# Patient Record
Sex: MALE | Race: WHITE | Employment: OTHER | URBAN - NONMETROPOLITAN AREA
[De-identification: names, ages, dates, MRNs, and addresses within clinical notes are randomized per-mention and may not be internally consistent; named-entity substitution may affect disease eponyms.]

---

## 2017-11-30 ENCOUNTER — HOSPITAL ENCOUNTER (OUTPATIENT)
Dept: INTERVENTIONAL RADIOLOGY/VASCULAR | Age: 77
Discharge: HOME | End: 2017-11-30
Payer: MEDICARE

## 2017-11-30 DIAGNOSIS — I73.9 INTERMITTENT CLAUDICATION (HCC): ICD-10-CM

## 2017-11-30 PROCEDURE — 93923 UPR/LXTR ART STDY 3+ LVLS: CPT

## 2019-09-10 ENCOUNTER — HOSPITAL ENCOUNTER (OUTPATIENT)
Dept: WOUND CARE | Age: 79
Discharge: HOME | End: 2019-09-10
Payer: MEDICARE

## 2019-09-10 VITALS
DIASTOLIC BLOOD PRESSURE: 68 MMHG | RESPIRATION RATE: 18 BRPM | SYSTOLIC BLOOD PRESSURE: 152 MMHG | OXYGEN SATURATION: 95 % | HEART RATE: 64 BPM | TEMPERATURE: 97.9 F

## 2019-09-10 DIAGNOSIS — I87.2 VENOUS (PERIPHERAL) INSUFFICIENCY: ICD-10-CM

## 2019-09-10 DIAGNOSIS — E11.621 TYPE 2 DIABETES MELLITUS WITH FOOT ULCER, UNSPECIFIED WHETHER LONG TERM INSULIN USE (HCC): ICD-10-CM

## 2019-09-10 DIAGNOSIS — L97.509 TYPE 2 DIABETES MELLITUS WITH FOOT ULCER, UNSPECIFIED WHETHER LONG TERM INSULIN USE (HCC): ICD-10-CM

## 2019-09-10 DIAGNOSIS — S91.301D OPEN WOUND OF RIGHT HEEL, SUBSEQUENT ENCOUNTER: Primary | ICD-10-CM

## 2019-09-10 PROCEDURE — 2709999900 HC NON-CHARGEABLE SUPPLY

## 2019-09-10 PROCEDURE — 97597 DBRDMT OPN WND 1ST 20 CM/<: CPT

## 2019-09-10 ASSESSMENT — PAIN SCALES - GENERAL: PAINLEVEL_OUTOF10: 0

## 2019-09-10 NOTE — PROGRESS NOTES
[facility]           H&P Note and Procedure Note      Hurshel Saint  MEDICAL RECORD NUMBER:  [id number]  AGE: 67 y.o. GENDER: male  : 1940  EPISODE DATE:  9/10/2019    Subjective:     Wound to the right heel      HISTORY of PRESENT ILLNESS HPI     Hurshel Saint is a 67 y.o. male New return known to Dr. Ese Groves referred by Dr. Rufina Nina, who presents today for wound/ulcer evaluation. History of Wound Context: right lateral heel wound. Patient states he has had the wound for approximately 2 weeks. Patient states there is no pain. Denies any drainage or signs of infection. Patient states that the wound may have been caused by his slippers rubbing. Wound/Ulcer Pain Timing/Severity: none  Quality of pain: N/A  Severity:  0 / 10   Modifying Factors: None  Associated Signs/Symptoms: none    Interval History:   Patient presents today for follow up on wound/ulcer's progression. The patient is currently not on antibiotics. Current dressing care includes promogran and dry dressing. Patient states he has bed bugs. Denies any N/V/D/F/C/SOB/CP. PAST MEDICAL HISTORY        Diagnosis Date    Arthritis     Blood circulation, collateral     Chronic kidney disease     Dr. Nida Pardo    Diabetes mellitus ([facility] [address]     GERD (gastroesophageal reflux disease)     Hyperlipidemia     Hypertension     Dr. Enoch Denver    Past Surgical History:   Procedure Laterality Date    BACK SURGERY      - Dr. Jaskaran Herzog      right hip 2000    MOUTH SURGERY      teeth pulled dentures       FAMILY HISTORY    History reviewed. No pertinent family history.     SOCIAL HISTORY    Social History     Tobacco Use    Smoking status: Former Smoker     Packs/day: 4.00     Years: 40.00     Pack years: 160.00   Substance Use Topics    Alcohol use: No    Drug use: Not on file       ALLERGIES    Allergies   Allergen Reactions    Sulfa Antibiotics Hives    Demerol Hcl
Hives    Demerol Hcl [Meperidine] Other (See Comments)     Violent behavior       MEDICATIONS    Current Outpatient Medications on File Prior to Encounter   Medication Sig Dispense Refill    pimecrolimus (ELIDEL) 1 % cream Apply topically 1 times daily to the legs 100 g 5    losartan (COZAAR) 100 MG tablet Take 100 mg by mouth daily      metolazone (ZAROXOLYN) 2.5 MG tablet Take 2.5 mg by mouth every other day      insulin regular (HUMULIN R) 100 UNIT/ML injection Inject into the skin See Admin Instructions Indications: sliding scale      hydrALAZINE (APRESOLINE) 25 MG tablet Take 25 mg by mouth 2 times daily      Insulin Glargine (LANTUS SOLOSTAR SC) Inject 70 Units into the skin nightly      furosemide (LASIX) 40 MG tablet Take 40 mg by mouth daily      FLUoxetine (PROZAC) 20 MG capsule Take 20 mg by mouth daily      metoprolol (TOPROL XL) 200 MG XL tablet Take 100 mg by mouth daily       ALPRAZolam (XANAX) 0.5 MG tablet Take 0.5 mg by mouth as needed (every four hours as needed)      simvastatin (ZOCOR) 40 MG tablet Take 40 mg by mouth nightly      OMEPRAZOLE PO Take 40 mg by mouth daily      tamsulosin (FLOMAX) 0.4 MG capsule Take 0.4 mg by mouth daily      finasteride (PROSCAR) 5 MG tablet Take 5 mg by mouth daily      aspirin 81 MG tablet Take 81 mg by mouth daily      Ginger, Zingiber officinalis, (GINGER ROOT) 550 MG CAPS Take by mouth      ferrous sulfate 325 (65 FE) MG tablet Take 325 mg by mouth daily (with breakfast)      acetaminophen (TYLENOL) 500 MG tablet Take 500 mg by mouth every 6 hours as needed for Pain       No current facility-administered medications on file prior to encounter. REVIEW OF SYSTEMS    Pertinent items are noted in HPI.     Objective:      BP (!) 152/68   Pulse 64   Temp 97.9 °F (36.6 °C) (Oral)   Resp 18   SpO2 95%     Wt Readings from Last 3 Encounters:   04/25/17 (!) 313 lb (142 kg)   10/18/16 (!) 324 lb (147 kg)   09/22/15 295 lb (133.8 kg)

## 2019-09-11 ENCOUNTER — TELEPHONE (OUTPATIENT)
Dept: WOUND CARE | Age: 79
End: 2019-09-11

## 2019-10-01 ENCOUNTER — HOSPITAL ENCOUNTER (OUTPATIENT)
Dept: WOUND CARE | Age: 79
Discharge: HOME | End: 2019-10-01
Payer: MEDICARE

## 2019-10-01 VITALS
HEIGHT: 68 IN | BODY MASS INDEX: 47.44 KG/M2 | WEIGHT: 313 LBS | SYSTOLIC BLOOD PRESSURE: 114 MMHG | OXYGEN SATURATION: 95 % | DIASTOLIC BLOOD PRESSURE: 58 MMHG | HEART RATE: 64 BPM | RESPIRATION RATE: 18 BRPM | TEMPERATURE: 97.6 F

## 2019-10-01 DIAGNOSIS — B88.8 INFESTATION BY BED BUG: ICD-10-CM

## 2019-10-01 PROCEDURE — 2709999900 HC NON-CHARGEABLE SUPPLY

## 2019-10-01 PROCEDURE — 97597 DBRDMT OPN WND 1ST 20 CM/<: CPT

## 2019-10-01 ASSESSMENT — PAIN SCALES - GENERAL: PAINLEVEL_OUTOF10: 0
